# Patient Record
Sex: MALE | Race: WHITE
[De-identification: names, ages, dates, MRNs, and addresses within clinical notes are randomized per-mention and may not be internally consistent; named-entity substitution may affect disease eponyms.]

---

## 2017-04-11 ENCOUNTER — HOSPITAL ENCOUNTER (EMERGENCY)
Dept: HOSPITAL 47 - EC | Age: 27
Discharge: HOME | End: 2017-04-11
Payer: COMMERCIAL

## 2017-04-11 VITALS — SYSTOLIC BLOOD PRESSURE: 141 MMHG | TEMPERATURE: 97 F | DIASTOLIC BLOOD PRESSURE: 76 MMHG | HEART RATE: 79 BPM

## 2017-04-11 VITALS — RESPIRATION RATE: 18 BRPM

## 2017-04-11 DIAGNOSIS — J45.901: Primary | ICD-10-CM

## 2017-04-11 DIAGNOSIS — F17.200: ICD-10-CM

## 2017-04-11 PROCEDURE — 71020: CPT

## 2017-04-11 PROCEDURE — 94644 CONT INHLJ TX 1ST HOUR: CPT

## 2017-04-11 PROCEDURE — 96372 THER/PROPH/DIAG INJ SC/IM: CPT

## 2017-04-11 PROCEDURE — 99285 EMERGENCY DEPT VISIT HI MDM: CPT

## 2017-04-11 NOTE — ED
SOB HPI





- General


Chief Complaint: Shortness of Breath


Stated Complaint: Difficulty breathing


Time Seen by Provider: 04/11/17 09:39


Source: patient, RN notes reviewed


Mode of arrival: ambulatory


Limitations: no limitations





- History of Present Illness


Initial Comments: 





26-year-old male presents to the emergency department with a chief complaint of 

shortness of breath.  Patient suffers from asthma.  Patient states she's been 

using his breathing treatments at home and his inhaler but he continues to have 

a week.  Patient states this came on last night.  Patient states that he is a 

former smoker however he still is in the house that does have smoking.  Patient 

states he hasn't really had a cough.  Patient states she just feels wheezy and 

tight.  Patient denies any fever chills with this.  Patient denies any feelings 

of being ill.  Patient states that he was concerned due to his continued 

shortness of breath so he thought that he should be evaluated. Patient denies 

any recent fever, chills, chest pain, back pain, abdominal pain, nausea vomiting

, numbness or tingling, dysuria or hematuria, constipation or diarrhea, 

headaches or visual changes, or any other current symptoms.





- Related Data


 Home Medications











 Medication  Instructions  Recorded  Confirmed


 


Albuterol Inhaler [Ventolin Hfa 1 - 2 puff INHALATION RT-Q4H PRN 04/11/17 04/11/ 17





Inhaler]   


 


Albuterol Nebulized [Ventolin 2.5 mg INHALATION RT-Q4H PRN 04/11/17 04/11/17





Nebulized]   








 Previous Rx's











 Medication  Instructions  Recorded


 


Albuterol Inhaler [Ventolin Hfa 1 - 2 puff INHALATION Q4-6H PRN #1 04/11/17





Inhaler] inhaler 


 


Albuterol Nebulized [Ventolin 2.5 mg INHALATION Q4H #20 nebu 04/11/17





Nebulized]  


 


predniSONE 50 mg PO DAILY #5 tab 04/11/17











 Allergies











Allergy/AdvReac Type Severity Reaction Status Date / Time


 


No Known Allergies Allergy   Verified 04/11/17 09:36














Review of Systems


ROS Statement: 


Those systems with pertinent positive or pertinent negative responses have been 

documented in the HPI.





ROS Other: All systems not noted in ROS Statement are negative.





Past Medical History


Past Medical History: Asthma


Additional Past Medical History / Comment(s): migraines


History of Any Multi-Drug Resistant Organisms: None Reported


Past Surgical History: No Surgical Hx Reported


Past Psychological History: No Psychological Hx Reported


Smoking Status: Current every day smoker


Past Alcohol Use History: Rare


Past Drug Use History: Marijuana





General Exam


Limitations: no limitations


General appearance: alert, in no apparent distress


Head exam: Present: atraumatic, normocephalic, normal inspection


Eye exam: Present: normal appearance, PERRL, EOMI.  Absent: scleral icterus, 

conjunctival injection, periorbital swelling


ENT exam: Present: normal exam, mucous membranes moist


Neck exam: Present: normal inspection.  Absent: tenderness, meningismus, 

lymphadenopathy


Respiratory exam: Present: wheezes (Diffusely).  Absent: respiratory distress, 

rales, rhonchi, stridor, chest wall tenderness, accessory muscle use, decreased 

breath sounds, prolonged expiratory


Cardiovascular Exam: Present: regular rate, normal rhythm, normal heart sounds.

  Absent: systolic murmur, diastolic murmur, rubs, gallop, clicks


Neurological exam: Present: alert, oriented X3, CN II-XII intact.  Absent: 

motor sensory deficit


Psychiatric exam: Present: normal affect, normal mood


Skin exam: Present: warm, dry, intact, normal color.  Absent: rash





Course


 Vital Signs











  04/11/17 04/11/17 04/11/17





  09:25 10:48 10:58


 


Temperature 97.9 F  


 


Pulse Rate 63 68 70


 


Respiratory 18  





Rate   


 


Blood Pressure 127/77  


 


O2 Sat by Pulse 95  





Oximetry   














  04/11/17 04/11/17





  11:16 11:31


 


Temperature  


 


Pulse Rate 68 66


 


Respiratory  





Rate  


 


Blood Pressure  


 


O2 Sat by Pulse  





Oximetry  














Medical Decision Making





- Medical Decision Making





26-year-old male presents to emergency department with a chief complaint of 

what appears to be an asthma exacerbation.  This time patient received steroids 

and continuous breathing treatment and he is starting to feel better.  This 

time we did start him on steroids for home.  We discussed continued region at 

the home.  We discussed return parameters and follow-up.  Patient stated that 

he understood and all his questions have been answered.  He will be discharged 

at this time.





- Radiology Data


Radiology results: report reviewed, image reviewed





Disposition


Clinical Impression: 


 Asthma exacerbation





Disposition: HOME SELF-CARE


Condition: Stable


Instructions:  Asthma (ED)


Additional Instructions: 


Please use medication as discussed. Please follow up with family doctor if 

symptoms have not improved over the next two days. Please return to the 

emergency room if your symptoms increase or worsen or for any other concerns. 


Prescriptions: 


Albuterol Inhaler [Ventolin Hfa Inhaler] 1 - 2 puff INHALATION Q4-6H PRN #1 

inhaler


 PRN Reason: Cough


Albuterol Nebulized [Ventolin Nebulized] 2.5 mg INHALATION Q4H #20 nebu


predniSONE 50 mg PO DAILY #5 tab


Referrals: 


None,Stated [Primary Care Provider] - 1-2 days


Tiny Bull MD [STAFF PHYSICIAN] - 1-2 days


Time of Disposition: 11:36

## 2017-11-21 NOTE — XR
EXAMINATION TYPE: XR chest 2V

 

DATE OF EXAM: 4/11/2017 9:59 AM

 

COMPARISON: Prior chest x-ray November 7, 2016.

 

HISTORY: History of asthma presents with wheezing and shortness of breath.

 

TECHNIQUE:  Frontal and lateral views of the chest are obtained.

 

FINDINGS:  There is no focal air space opacity, pleural effusion, or pneumothorax seen.  The cardiac 
silhouette size is within normal limits.   The osseous structures are intact.

 

IMPRESSION:  No suspicious acute pulmonary process. 7

## 2018-06-24 ENCOUNTER — HOSPITAL ENCOUNTER (EMERGENCY)
Dept: HOSPITAL 47 - EC | Age: 28
Discharge: HOME | End: 2018-06-24
Payer: COMMERCIAL

## 2018-06-24 VITALS — HEART RATE: 65 BPM | TEMPERATURE: 98.2 F | SYSTOLIC BLOOD PRESSURE: 145 MMHG | DIASTOLIC BLOOD PRESSURE: 79 MMHG

## 2018-06-24 VITALS — RESPIRATION RATE: 18 BRPM

## 2018-06-24 DIAGNOSIS — J45.909: Primary | ICD-10-CM

## 2018-06-24 DIAGNOSIS — Z91.09: ICD-10-CM

## 2018-06-24 DIAGNOSIS — F17.200: ICD-10-CM

## 2018-06-24 PROCEDURE — 96372 THER/PROPH/DIAG INJ SC/IM: CPT

## 2018-06-24 PROCEDURE — 71046 X-RAY EXAM CHEST 2 VIEWS: CPT

## 2018-06-24 PROCEDURE — 94640 AIRWAY INHALATION TREATMENT: CPT

## 2018-06-24 PROCEDURE — 99284 EMERGENCY DEPT VISIT MOD MDM: CPT

## 2018-06-24 NOTE — ED
URI HPI





- General


Chief Complaint: Upper Respiratory Infection


Stated Complaint: URI/hx ashma


Time Seen by Provider: 18 11:20


Source: patient, RN notes reviewed, old records reviewed


Mode of arrival: ambulatory


Limitations: no limitations





- History of Present Illness


Initial Comments: 





27-year-old male history of asthma presents emergency Department chief 

complaint shortness breath cough.  He reports more so the past few days.  

Denies any fevers or chills.  He states the cough has been mildly productive.  

Patient states that he and his girlfriend are both sick same time.  He has had 

2  inhaler more frequently.  No vomiting.





- Related Data


 Home Medications











 Medication  Instructions  Recorded  Confirmed


 


Albuterol Inhaler [Ventolin Hfa 1 - 2 puff INHALATION RT-Q4H PRN 17





Inhaler]   


 


Albuterol Nebulized [Ventolin 2.5 mg INHALATION RT-Q4H PRN 17





Nebulized]   








 Previous Rx's











 Medication  Instructions  Recorded


 


Albuterol Inhaler [Ventolin Hfa 1 - 2 puff INHALATION RT-Q6H PRN 18





Inhaler] #1 inhaler 


 


Azithromycin [Zithromax Z-pack] 250 mg PO AS DIRECTED #6 tab 18


 


Ipratropium-Albuterol Nebulize 3 ml INHALATION TID #30 neb 18





[Duoneb 0.5 mg-3 mg/3 ml Soln]  


 


predniSONE 10 mg PO DAILY #15 tab 18











 Allergies











Allergy/AdvReac Type Severity Reaction Status Date / Time


 


cat dander Allergy  Swelling Verified 18 11:15














Review of Systems


ROS Statement: 


Those systems with pertinent positive or pertinent negative responses have been 

documented in the HPI.





ROS Other: All systems not noted in ROS Statement are negative.





Past Medical History


Past Medical History: Asthma


Additional Past Medical History / Comment(s): migraines


History of Any Multi-Drug Resistant Organisms: None Reported


Past Surgical History: No Surgical Hx Reported


Past Psychological History: No Psychological Hx Reported


Smoking Status: Current every day smoker


Past Alcohol Use History: Rare


Past Drug Use History: Marijuana





General Exam





- General Exam Comments


Initial Comments: 





27-year-old male.  Alert and oriented.  No significant distress.


Limitations: no limitations


General appearance: alert, in no apparent distress


Head exam: Present: atraumatic, normocephalic, normal inspection


Eye exam: Present: normal appearance, PERRL, EOMI.  Absent: scleral icterus, 

conjunctival injection, periorbital swelling


ENT exam: Present: normal exam, mucous membranes moist


Neck exam: Present: normal inspection.  Absent: tenderness, meningismus, 

lymphadenopathy


Respiratory exam: Present: wheezes.  Absent: normal lung sounds bilaterally, 

respiratory distress, rales, rhonchi, stridor


Cardiovascular Exam: Present: regular rate, normal rhythm, normal heart sounds.

  Absent: systolic murmur, diastolic murmur, rubs, gallop, clicks


GI/Abdominal exam: Present: soft, normal bowel sounds.  Absent: distended, 

tenderness, guarding, rebound, rigid


Extremities exam: Present: normal inspection, full ROM, normal capillary 

refill.  Absent: tenderness, pedal edema, joint swelling, calf tenderness


Back exam: Present: normal inspection


Neurological exam: Present: alert, oriented X3, CN II-XII intact


Psychiatric exam: Present: normal affect, normal mood





Course


 Vital Signs











  18





  11:11 12:16 12:23


 


Temperature 98.7 F  


 


Pulse Rate 63 64 68


 


Respiratory 18  





Rate   


 


Blood Pressure 130/82  


 


O2 Sat by Pulse 94 L  





Oximetry   














  18





  13:11


 


Temperature 98.2 F


 


Pulse Rate 65


 


Respiratory 18





Rate 


 


Blood Pressure 145/79


 


O2 Sat by Pulse 93 L





Oximetry 














Medical Decision Making





- Medical Decision Making





27-year-old male presents for estrogen complaint of wheezing shortness of 

breath.  History of asthma.  He was given IM Solu-Medrol, DuoNeb treatment. 

Patient CXR was reviewed adn unremarkable. He reports cough is productive. 

Patient has sngificant improevement after douneb treatment. Will DC with inhaler

, steroids, and Azithromycin for bronchitis. Discussed PCP follow up. Will also 

write for douneb to use in nebulizer athome. 





- Radiology Data


Radiology results: report reviewed





Chest x-ray negative for any acute process.





Disposition


Clinical Impression: 


 Asthma, Bronchitis





Disposition: HOME SELF-CARE


Condition: Good


Instructions:  Acute Bronchitis (ED)


Additional Instructions: 


Patient has a follow-up with primary care provider.  Return to the emergency 

department if any alarming signs or symptoms occur.


Prescriptions: 


Albuterol Inhaler [Ventolin Hfa Inhaler] 1 - 2 puff INHALATION RT-Q6H PRN #1 

inhaler


 PRN Reason: Shortness Of Breath


Azithromycin [Zithromax Z-pack] 250 mg PO AS DIRECTED #6 tab


Ipratropium-Albuterol Nebulize [Duoneb 0.5 mg-3 mg/3 ml Soln] 3 ml INHALATION 

TID #30 neb


predniSONE 10 mg PO DAILY #15 tab


Is patient prescribed a controlled substance at d/c from ED?: No


When asked, does pt state using other controlled substances?: No


If prescribed controlled substance>3 days was MAPS reviewed?: No


If opioid is for acute pain is fill amount 7 days or less?: No


If Rx opioid, was Start Talking consent form obtained?: No


Referrals: 


None,Stated [Primary Care Provider] - 1-2 days


Tiny Bull MD [STAFF PHYSICIAN] - 1-2 days


Time of Disposition: 12:35

## 2018-06-24 NOTE — XR
EXAMINATION TYPE: XR chest 2V

 

DATE OF EXAM: 6/24/2018

 

COMPARISON: 4/11/2017

 

INDICATION: Pain

 

TECHNIQUE:  Frontal and lateral views of the chest are obtained.

 

FINDINGS:  

The heart size is normal.  

The pulmonary vasculature is normal.

The lungs are clear.

 

IMPRESSION:  

1. No acute pulmonary process.